# Patient Record
Sex: MALE | Race: WHITE | NOT HISPANIC OR LATINO | URBAN - METROPOLITAN AREA
[De-identification: names, ages, dates, MRNs, and addresses within clinical notes are randomized per-mention and may not be internally consistent; named-entity substitution may affect disease eponyms.]

---
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
## 2022-10-07 ENCOUNTER — OFFICE VISIT (OUTPATIENT)
Dept: NEUROLOGY | Facility: MEDICAL CENTER | Age: 22
End: 2022-10-07
Attending: PSYCHIATRY & NEUROLOGY
Payer: COMMERCIAL

## 2022-10-07 VITALS
WEIGHT: 119.05 LBS | HEART RATE: 102 BPM | DIASTOLIC BLOOD PRESSURE: 78 MMHG | OXYGEN SATURATION: 94 % | SYSTOLIC BLOOD PRESSURE: 128 MMHG | TEMPERATURE: 97.7 F

## 2022-10-07 DIAGNOSIS — G25.3 MYOCLONUS: ICD-10-CM

## 2022-10-07 PROCEDURE — 99204 OFFICE O/P NEW MOD 45 MIN: CPT | Performed by: PSYCHIATRY & NEUROLOGY

## 2022-10-07 PROCEDURE — 99202 OFFICE O/P NEW SF 15 MIN: CPT | Performed by: PSYCHIATRY & NEUROLOGY

## 2022-10-07 RX ORDER — ERGOCALCIFEROL 1.25 MG/1
1.25 CAPSULE ORAL
COMMUNITY
Start: 2022-09-24

## 2022-10-07 NOTE — PROGRESS NOTES
Chief Complaint   Patient presents with    New Patient     Movement disorder        History of present illness:  Renan Stevens 22 y.o. male with nocturnal jerks when he wakes up from sleep about once or twice weekly.   He is in bed when this occurs. This happens only if he is napping, in bed.   Over a 30 min period after waking, he may have recurrent jerks of his arms and legs. He is dropping things when he is at the breakfast table. There is 1 jerk per minute over this period that decrease in frequency over a 30 min period.     No personal history of seizures. No family history of epilepsy.   He has had this issue for the last 3 years. This is becoming more frequent in the morning.   He sleeps 6 hours/night but denies cataplexy.     No delay in motor or speech milestones.   He has completed high school and is in his 4th year of college. He is high performing academically.   He has high anxiety.     Past medical history:   Past Medical History:   Diagnosis Date    No known health problems        Past surgical history:   No past surgical history on file.    Family history:   Family History   Problem Relation Age of Onset    Asthma Sister        Social history:   Social History     Socioeconomic History    Marital status: Unknown     Spouse name: Not on file    Number of children: Not on file    Years of education: Not on file    Highest education level: Not on file   Occupational History    Not on file   Tobacco Use    Smoking status: Never    Smokeless tobacco: Never   Vaping Use    Vaping Use: Never used   Substance and Sexual Activity    Alcohol use: No    Drug use: No    Sexual activity: Never   Other Topics Concern    Behavioral problems Not Asked    Interpersonal relationships Not Asked    Sad or not enjoying activities Not Asked    Suicidal thoughts Not Asked    Poor school performance Not Asked    Reading difficulties Not Asked    Speech difficulties Not Asked    Writing difficulties Not Asked    Inadequate  sleep Not Asked    Excessive TV viewing Not Asked    Excessive video game use Not Asked    Inadequate exercise Not Asked    Sports related Not Asked    Poor diet Not Asked    Family concerns for drug/alcohol abuse Not Asked    Poor oral hygiene Not Asked    Bike safety Not Asked    Family concerns vehicle safety Not Asked   Social History Narrative    Not on file     Social Determinants of Health     Financial Resource Strain: Not on file   Food Insecurity: Not on file   Transportation Needs: Not on file   Physical Activity: Not on file   Stress: Not on file   Social Connections: Not on file   Intimate Partner Violence: Not on file   Housing Stability: Not on file       Current medications:   Current Outpatient Medications   Medication    vitamin D2, Ergocalciferol, (DRISDOL) 1.25 MG (35391 UT) Cap capsule     No current facility-administered medications for this visit.       Medication Allergy:  No Known Allergies    Physical examination:   Vitals:    10/07/22 1616   BP: 128/78   BP Location: Right arm   Patient Position: Sitting   BP Cuff Size: Adult   Pulse: (!) 102   Temp: 36.5 °C (97.7 °F)   TempSrc: Temporal   SpO2: 94%   Weight: 54 kg (119 lb 0.8 oz)     Neurological Exam  Mental Status  Awake and alert. Speech is normal. Language is fluent with no aphasia.    Cranial Nerves  CN III, IV, VI: Extraocular movements intact bilaterally. No nystagmus. Normal smooth pursuit.  CN VII:  Right: There is no facial weakness.  Left: There is no facial weakness.    Motor   No abnormal involuntary movements. Strength is 5/5 throughout all four extremities.    Sensory  Light touch is normal in upper and lower extremities.     Coordination  Right: Finger-to-nose normal. Rapid alternating movement normal.Left: Finger-to-nose normal. Rapid alternating movement normal.    Gait  Casual gait is normal including stance, stride, and arm swing.    Labs:  I reviewed the following labs personally:  None    Imaging:   None      ASSESSMENT AND PLAN:  Problem List Items Addressed This Visit    None  Visit Diagnoses       Myoclonus        Relevant Orders    Comp Metabolic Panel    TSH    Referral to Neurodiagnostics (EEG,EP,EMG/NCS/DBS)            1. Myoclonus  - Comp Metabolic Panel; Future  - TSH; Future  - Referral to Neurodiagnostics (EEG,EP,EMG/NCS/DBS)    Other orders  - vitamin D2, Ergocalciferol, (DRISDOL) 1.25 MG (93380 UT) Cap capsule; Take 1.25 Units by mouth one time as needed. Weekly    22-year-old male with myoclonic jerks that continued for about 30 minutes after he wakes up.  These are not solely limited to sleep or drowsy state.  There is no family history of epilepsy and he is normal developmentally.  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  He does not have evidence of a neurodegenerative disorder.  I have ordered metabolic panel, and have ordered an ambulatory EEG to hopefully capture the myoclonic jerks.  He will follow-up after the results of these tests.    FOLLOW-UP:   Return in about 3 months (around 1/7/2023) for virtual follow-up .    Total time spent for the day for this patient unrelated to procedure time is: 31 minutes. I spent 24 minutes in face to face time and I spent 2 minutes pre-charting and 5 minutes in post-visit documentation.      Dr. Rangel Gomez D.O.  [facility]

## 2022-10-07 NOTE — PATIENT INSTRUCTIONS
I have ordered blood tests - a metabolic panel and a thyroid test.     You will be called to schedule the EEG.

## 2022-12-27 ENCOUNTER — NON-PROVIDER VISIT (OUTPATIENT)
Dept: NEUROLOGY | Facility: MEDICAL CENTER | Age: 22
End: 2022-12-27
Attending: STUDENT IN AN ORGANIZED HEALTH CARE EDUCATION/TRAINING PROGRAM
Payer: COMMERCIAL

## 2022-12-27 DIAGNOSIS — G25.3 MYOCLONUS: ICD-10-CM

## 2022-12-27 PROCEDURE — 95708 EEG WO VID EA 12-26HR UNMNTR: CPT | Performed by: STUDENT IN AN ORGANIZED HEALTH CARE EDUCATION/TRAINING PROGRAM

## 2022-12-27 PROCEDURE — 99999 PR NO CHARGE: CPT | Performed by: STUDENT IN AN ORGANIZED HEALTH CARE EDUCATION/TRAINING PROGRAM

## 2022-12-27 PROCEDURE — 95700 EEG CONT REC W/VID EEG TECH: CPT | Performed by: PSYCHIATRY & NEUROLOGY

## 2022-12-27 PROCEDURE — 95700 EEG CONT REC W/VID EEG TECH: CPT | Performed by: STUDENT IN AN ORGANIZED HEALTH CARE EDUCATION/TRAINING PROGRAM

## 2022-12-27 PROCEDURE — 95719 EEG PHYS/QHP EA INCR W/O VID: CPT | Performed by: STUDENT IN AN ORGANIZED HEALTH CARE EDUCATION/TRAINING PROGRAM

## 2022-12-27 PROCEDURE — 95708 EEG WO VID EA 12-26HR UNMNTR: CPT | Performed by: PSYCHIATRY & NEUROLOGY

## 2022-12-27 NOTE — PROCEDURES
AMBULATORY ELECTROENCEPHALOGRAM REPORT      Referring provider:   Chang Gomez D.O.  [address]  [facility] [place] [address]      DOS: 12/27/2022       Duration of Recording: (23 hours and 8 minutes)      INDICATION:  Renan Stevens 22 y.o. male presenting with   myoclonic jerks    CURRENT OUTPATIENT MEDICATION LIST:   Current Outpatient Medications   Medication Instructions    vitamin D2 (Ergocalciferol) (DRISDOL) 1.25 Units, Oral, ONCE PRN, Weekly         TECHNIQUE: The EEG was set up and taken down by a Neurodiagnostic technologist who performed education to patient and staff.   A minimum but not limited to 23 electrodes and 23 channel recording was setup and performed by Neurodiagnostic technologist.   Impedances, electrode integrity, and technical impressions were documented a minimum of every 2-24 hour period by a Neurodiagnostic Technologist and reviewed by Interpreting Physician.    DESCRIPTION OF THE RECORD:  During maximal wakefulness, the background was continuous and showed a 9 Hz posterior dominant rhythm.  Reactivity and state changes were present.  During drowsiness, theta/delta frequencies were seen.    Sleep was captured and was characterized by diffuse background delta/theta activity with a loss of myogenic artifact.  N2 sleep transients in the form of sleep spindles and vertex waves were seen in the leads over the central regions.     ACTIVATION PROCEDURES:   Hyperventilation was performed by the patient for a total of 3 minutes. The technician performing the test noted good effort. This technique did not elicited any abnormalities on EEG.  and Intermittent Photic stimulation was performed in a stepwise fashion from 1 to 30 Hz and did not elicited any abnormalities on EEG.     ICTAL AND INTERICTAL FINDINGS:   Rare generalized spikes/polyspike and wave discharges in wakefulness and sleep, at times appearing fragmented.    No regional slowing was seen during this routine study.      No  seizures.    EKG: Sampling of the EKG recording did not demonstrate any abnormalities    EVENTS:  Push button events and/or ambulatory diary events: None    INTERPRETATION:  This is an abnormal ambulatory EEG recording in the awake and drowsy/sleep state(s):  -Rare generalized spikes/polyspike and wave discharges in wakefulness and sleep, at times appearing fragmented.  In the context of patient's history of myoclonus, the EEG findings may be consistent with a generalized epilepsy.   -No persistent focal asymmetries seen.  -No seizures. Clinical correlation is recommended.   -Clinical Events:  None          Paul Figueroa MD  Department of Neurology at [facility]  General Neurologist and Epileptologist  Director of [facility]'s Level III Comprehensive Epilepsy Program  Professor of Clinical Neurology, [facility].   Phone: [phone]  Fax: [phone]  E-mail: [email or web address]

## 2022-12-28 ENCOUNTER — NON-PROVIDER VISIT (OUTPATIENT)
Dept: NEUROLOGY | Facility: MEDICAL CENTER | Age: 22
End: 2022-12-28
Attending: STUDENT IN AN ORGANIZED HEALTH CARE EDUCATION/TRAINING PROGRAM
Payer: COMMERCIAL

## 2022-12-28 DIAGNOSIS — G25.3 MYOCLONUS: ICD-10-CM

## 2022-12-28 PROCEDURE — 95708 EEG WO VID EA 12-26HR UNMNTR: CPT | Performed by: STUDENT IN AN ORGANIZED HEALTH CARE EDUCATION/TRAINING PROGRAM

## 2022-12-28 PROCEDURE — 95719 EEG PHYS/QHP EA INCR W/O VID: CPT | Performed by: STUDENT IN AN ORGANIZED HEALTH CARE EDUCATION/TRAINING PROGRAM

## 2022-12-28 NOTE — PROCEDURES
"AMBULATORY ELECTROENCEPHALOGRAM REPORT      Referring provider:   Chang Gomez D.O.  [address] [place] [address]      DOS: 12/28/2022       Duration of Recording: (24 hours and 1 minutes)      INDICATION:  Renan Stevens 22 y.o. male presenting with   myoclonic jerks    CURRENT OUTPATIENT MEDICATION LIST:   Current Outpatient Medications   Medication Instructions    vitamin D2 (Ergocalciferol) (DRISDOL) 1.25 Units, Oral, ONCE PRN, Weekly         TECHNIQUE: The EEG was set up and taken down by a Neurodiagnostic technologist who performed education to patient and staff.   A minimum but not limited to 23 electrodes and 23 channel recording was setup and performed by Neurodiagnostic technologist.   Impedances, electrode integrity, and technical impressions were documented a minimum of every 2-24 hour period by a Neurodiagnostic Technologist and reviewed by Interpreting Physician.    DESCRIPTION OF THE RECORD:  During maximal wakefulness, the background was continuous and showed a 9 Hz posterior dominant rhythm.  Reactivity and state changes were present.  During drowsiness, theta/delta frequencies were seen.    Sleep was captured and was characterized by diffuse background delta/theta activity with a loss of myogenic artifact.  N2 sleep transients in the form of sleep spindles and vertex waves were seen in the leads over the central regions.     ACTIVATION PROCEDURES:   NA    ICTAL AND INTERICTAL FINDINGS:   Occasional and, at times, frequent generalized spikes/polyspike and wave discharges, at times in runs at 3.5-5 Hz for 0.5-2 seconds. Discharges were more frequently seen in the early morning upon waking and were less frequently present and more fragmented in sleep.     No regional slowing was seen during this routine study.          EKG: Sampling of the EKG recording did not demonstrate any abnormalities    EVENTS:  Push button events and/or ambulatory diary events: One event of \"muscle jerking\" " "at 8:12 AM was associated with generalized 3-4 Hz polyspike and wave discharges and EMG artifact consistent with epileptic myoclonus.     INTERPRETATION:  This is an abnormal ambulatory EEG recording in the awake and drowsy/sleep state(s):  -Occasional and, at times, frequent generalized spikes/polyspike and wave discharges, at times in runs at 3.5-5 Hz for 0.5-2 seconds. Discharges were more frequently seen in the early morning upon waking and were less frequently present and more fragmented in sleep. One event of \"muscle jerking\" at 8:12 AM was associated with generalized 3-4 Hz polyspike and wave discharges and EMG artifact consistent with epileptic myoclonus. 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              Paul Figueroa MD  Department of Neurology at [facility]  General Neurologist and Epileptologist  Director of [facility]'s Level III Comprehensive Epilepsy Program  Professor of Clinical Neurology, [facility].   Phone: [phone]  Fax: [phone]  E-mail: [email or web address]     "

## 2022-12-29 ENCOUNTER — NON-PROVIDER VISIT (OUTPATIENT)
Dept: NEUROLOGY | Facility: MEDICAL CENTER | Age: 22
End: 2022-12-29
Attending: PSYCHIATRY & NEUROLOGY
Payer: COMMERCIAL

## 2022-12-29 DIAGNOSIS — G25.3 MYOCLONUS: ICD-10-CM

## 2022-12-29 PROCEDURE — 95719 EEG PHYS/QHP EA INCR W/O VID: CPT | Performed by: STUDENT IN AN ORGANIZED HEALTH CARE EDUCATION/TRAINING PROGRAM

## 2022-12-29 PROCEDURE — 95708 EEG WO VID EA 12-26HR UNMNTR: CPT | Performed by: STUDENT IN AN ORGANIZED HEALTH CARE EDUCATION/TRAINING PROGRAM

## 2022-12-29 NOTE — PROCEDURES
AMBULATORY ELECTROENCEPHALOGRAM REPORT      Referring provider:   Chang Gomez D.O.  [address]  [facility] [place] [address]      DOS: 12/29/2022       Duration of Recording: (23 hours and 10 minutes)      INDICATION:  Renan Stevens 22 y.o. male presenting with   myoclonic jerks    CURRENT OUTPATIENT MEDICATION LIST:   Current Outpatient Medications   Medication Instructions    vitamin D2 (Ergocalciferol) (DRISDOL) 1.25 Units, Oral, ONCE PRN, Weekly         TECHNIQUE: The EEG was set up and taken down by a Neurodiagnostic technologist who performed education to patient and staff.   A minimum but not limited to 23 electrodes and 23 channel recording was setup and performed by Neurodiagnostic technologist.   Impedances, electrode integrity, and technical impressions were documented a minimum of every 2-24 hour period by a Neurodiagnostic Technologist and reviewed by Interpreting Physician.    DESCRIPTION OF THE RECORD:  During maximal wakefulness, the background was continuous and showed a 9 Hz posterior dominant rhythm.  Reactivity and state changes were present.  During drowsiness, theta/delta frequencies were seen.    Sleep was captured and was characterized by diffuse background delta/theta activity with a loss of myogenic artifact.  N2 sleep transients in the form of sleep spindles and vertex waves were seen in the leads over the central regions.     ACTIVATION PROCEDURES:   NA    ICTAL AND INTERICTAL FINDINGS:   Occasional and, at times, frequent generalized spikes/polyspike and wave discharges, at times in runs at 3.5-5 Hz for 0.5-2 seconds. There was one run lasting 7 seconds that was not reported by the patient.  Discharges were more frequently seen in the early morning upon waking and were less frequently present and more fragmented in sleep.     No regional slowing was seen during this routine study.          EKG: Sampling of the EKG recording did not demonstrate any abnormalities    EVENTS:  Two  "events of \"muscle jerking\" were associated with generalized 3-4 Hz polyspike and wave discharges and EMG artifact consistent with epileptic myoclonus.     INTERPRETATION:  This is an abnormal ambulatory EEG recording in the awake and drowsy/sleep state(s):  -Occasional and, at times, frequent generalized spikes/polyspike and wave discharges, at times in runs at 3.5-5 Hz for 0.5-2 seconds. There was one run lasting 7 seconds that was not reported by the patient. Discharges were more frequently seen in the early morning and upon awaking; they were less frequently present and more fragmented in sleep. Two events of \"muscle jerking\" were associated with generalized 3-4 Hz polyspike and wave discharges and EMG artifact consistent with epileptic myoclonus. 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              Paul Figueroa MD  Department of Neurology at [facility]  General Neurologist and Epileptologist  Director of [facility]'s Level III Comprehensive Epilepsy Program  Professor of Clinical Neurology, [facility].   Phone: [phone]  Fax: [phone]  E-mail: [email or web address]     "

## 2022-12-30 ENCOUNTER — NON-PROVIDER VISIT (OUTPATIENT)
Dept: NEUROLOGY | Facility: MEDICAL CENTER | Age: 22
End: 2022-12-30
Attending: PSYCHIATRY & NEUROLOGY
Payer: COMMERCIAL

## 2022-12-30 DIAGNOSIS — G40.309 GENERALIZED EPILEPSY (HCC): ICD-10-CM

## 2022-12-30 PROCEDURE — 99999 PR NO CHARGE: CPT | Performed by: STUDENT IN AN ORGANIZED HEALTH CARE EDUCATION/TRAINING PROGRAM

## 2022-12-30 RX ORDER — LEVETIRACETAM 750 MG/1
750 TABLET ORAL 2 TIMES DAILY
Qty: 180 TABLET | Refills: 3 | Status: SHIPPED | OUTPATIENT
Start: 2022-12-30 | End: 2024-01-12

## 2023-01-13 ENCOUNTER — TELEPHONE (OUTPATIENT)
Dept: NEUROLOGY | Facility: MEDICAL CENTER | Age: 23
End: 2023-01-13
Payer: COMMERCIAL

## 2023-01-20 ENCOUNTER — OFFICE VISIT (OUTPATIENT)
Dept: NEUROLOGY | Facility: MEDICAL CENTER | Age: 23
End: 2023-01-20
Attending: PSYCHIATRY & NEUROLOGY
Payer: COMMERCIAL

## 2023-01-20 VITALS
SYSTOLIC BLOOD PRESSURE: 134 MMHG | DIASTOLIC BLOOD PRESSURE: 60 MMHG | WEIGHT: 147.71 LBS | BODY MASS INDEX: 20.68 KG/M2 | HEIGHT: 71 IN | TEMPERATURE: 96.7 F | OXYGEN SATURATION: 99 % | HEART RATE: 105 BPM

## 2023-01-20 DIAGNOSIS — G40.B09 NONINTRACTABLE JUVENILE MYOCLONIC EPILEPSY WITHOUT STATUS EPILEPTICUS (HCC): ICD-10-CM

## 2023-01-20 PROBLEM — G25.3 MYOCLONUS: Status: RESOLVED | Noted: 2022-10-07 | Resolved: 2023-01-20

## 2023-01-20 PROCEDURE — 99213 OFFICE O/P EST LOW 20 MIN: CPT | Performed by: PSYCHIATRY & NEUROLOGY

## 2023-01-20 PROCEDURE — 99211 OFF/OP EST MAY X REQ PHY/QHP: CPT | Performed by: PSYCHIATRY & NEUROLOGY

## 2023-01-20 ASSESSMENT — PATIENT HEALTH QUESTIONNAIRE - PHQ9: CLINICAL INTERPRETATION OF PHQ2 SCORE: 0

## 2023-01-20 NOTE — PROGRESS NOTES
Chief Complaint   Patient presents with    Follow-Up     Seizure Disorder       History of present illness:  Renan Stevens 22 y.o. male with nocturnal jerks when he wakes up from sleep about once or twice weekly.   He is in bed when this occurs. This happens only if he is napping, in bed.   Over a 30 min period after waking, he may have recurrent jerks of his arms and legs. He is dropping things when he is at the breakfast table. There is 1 jerk per minute over this period that decrease in frequency over a 30 min period.      No personal history of seizures. No family history of epilepsy.   He has had this issue for the last 3 years. This is becoming more frequent in the morning.   He sleeps 6 hours/night but denies cataplexy.      No delay in motor or speech milestones.   He has completed high school and is in his 4th year of [employer]. He is high performing academically.   He has high anxiety.      1/20/23: His EEG demonstrated findings consistent with MALA. He is on keppra 750mg twice daily and this has resolved his myoclonic seizures. There have been times when people have been saying his name and have not been able to get his attention when he is sitting. This is not a frequent occurrence and the last episode was about 6 months ago.   The same day that he was started on keppra his myoclonus resolved. He is tolerating the drug well, with some mild fatigue.     Past medical history:   Past Medical History:   Diagnosis Date    No known health problems        Past surgical history:   No past surgical history on file.    Family history:   Family History   Problem Relation Age of Onset    Asthma Sister        Social history:   Social History     Socioeconomic History    Marital status: Unknown     Spouse name: Not on file    Number of children: Not on file    Years of education: Not on file    Highest education level: Not on file   Occupational History    Not on file   Tobacco Use    Smoking status: Never    Smokeless  "tobacco: Never   Vaping Use    Vaping Use: Never used   Substance and Sexual Activity    Alcohol use: No    Drug use: No    Sexual activity: Never   Other Topics Concern    Behavioral problems Not Asked    Interpersonal relationships Not Asked    Sad or not enjoying activities Not Asked    Suicidal thoughts Not Asked    Poor school performance Not Asked    Reading difficulties Not Asked    Speech difficulties Not Asked    Writing difficulties Not Asked    Inadequate sleep Not Asked    Excessive TV viewing Not Asked    Excessive video game use Not Asked    Inadequate exercise Not Asked    Sports related Not Asked    Poor diet Not Asked    Family concerns for drug/alcohol abuse Not Asked    Poor oral hygiene Not Asked    Bike safety Not Asked    Family concerns vehicle safety Not Asked   Social History Narrative    Not on file     Social Determinants of Health     Financial Resource Strain: Not on file   Food Insecurity: Not on file   Transportation Needs: Not on file   Physical Activity: Not on file   Stress: Not on file   Social Connections: Not on file   Intimate Partner Violence: Not on file   Housing Stability: Not on file       Current medications:   Current Outpatient Medications   Medication    levetiracetam (KEPPRA) 750 MG tablet    vitamin D2, Ergocalciferol, (DRISDOL) 1.25 MG (67536 UT) Cap capsule     No current facility-administered medications for this visit.       Medication Allergy:  No Known Allergies    Physical examination:   Vitals:    01/20/23 0708   BP: 134/60   BP Location: Right arm   Patient Position: Sitting   BP Cuff Size: Adult   Pulse: (!) 105   Temp: 35.9 °C (96.7 °F)   TempSrc: Temporal   SpO2: 99%   Weight: 67 kg (147 lb 11.3 oz)   Height: 1.803 m (5' 11\")     Labs:  I reviewed the following labs personally:  None     Imaging:   EEG  INTERPRETATION:  This is an abnormal ambulatory EEG recording in the awake and drowsy/sleep state(s):  -Occasional and, at times, frequent generalized " "spikes/polyspike and wave discharges, at times in runs at 3.5-5 Hz for 0.5-2 seconds. There was one run lasting 7 seconds that was not reported by the patient. Discharges were more frequently seen in the early morning and upon awaking; they were less frequently present and more fragmented in sleep. Two events of \"muscle jerking\" were associated with generalized 3-4 Hz polyspike and wave discharges and EMG artifact consistent with epileptic myoclonus. 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  EEG demonstrated generalized polyspike and wave discharges at 3.5 to 5 Hz.  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  Patient's myoclonic jerks are resolved after starting Keppra.  I have counseled him on the diagnosis of juvenile myoclonic epilepsy.  We will not proceed with structural MRI imaging given that this is usually normal in this condition.  No further work-up is required.  We will follow-up in 6 months.    FOLLOW-UP:   Return in about 6 months (around 7/20/2023).    Total time spent for the day for this patient unrelated to procedure time is: 25 minutes. I spent 8 minutes in face to face time and I spent 12 minutes pre-charting and 5 minutes in post-visit documentation.      Dr. Rangel Gomez D.O.  [facility]    "

## 2023-01-20 NOTE — PATIENT INSTRUCTIONS
Juvenile Myoclonic Epilepsy     MALA is a fairly common epilepsy syndrome that usually begins between the ages of 12 to 18 years. Seizure symptoms include myoclonic jerking of the shoulders, arms and sometimes legs. Absence seizures may also be present. Seizures usually occur early in the morning or within a couple of hours of awakening. In many cases, and after a number of years, the young person will start to experience generalised tonic-clonic seizures.  MALA has a strong genetic component, with up to half of all affected children having a family history of seizures or epilepsy.  Seizures can be triggered by early awakening, lack of sleep, alcohol, drugs, fasting, menstruation or flickering lights (photosensitive epilepsy). Because of this it is particularly important for adolescents living with MALA to adopt regular lifestyle habits and choices and follow their treatment plan carefully.  People living with MALA will usually require lifelong treatment with AEDs, and generally have a good prognosis.    https://www.epilepsy.com/what-[email or web address]-epilepsy/syndromes/juvenile-myoclonic-epilepsy#:~:text=Myoclonic%20jerks%20or%20seizures%20in,or%20prone%20to%20dropping%20things.

## 2023-07-25 ENCOUNTER — OFFICE VISIT (OUTPATIENT)
Dept: NEUROLOGY | Facility: MEDICAL CENTER | Age: 23
End: 2023-07-25
Attending: PSYCHIATRY & NEUROLOGY
Payer: COMMERCIAL

## 2023-07-25 VITALS
BODY MASS INDEX: 19.69 KG/M2 | HEART RATE: 87 BPM | OXYGEN SATURATION: 98 % | DIASTOLIC BLOOD PRESSURE: 72 MMHG | WEIGHT: 140.65 LBS | TEMPERATURE: 98 F | HEIGHT: 71 IN | RESPIRATION RATE: 14 BRPM | SYSTOLIC BLOOD PRESSURE: 128 MMHG

## 2023-07-25 DIAGNOSIS — G40.B09 NONINTRACTABLE JUVENILE MYOCLONIC EPILEPSY WITHOUT STATUS EPILEPTICUS (HCC): ICD-10-CM

## 2023-07-25 PROCEDURE — 3074F SYST BP LT 130 MM HG: CPT | Performed by: PSYCHIATRY & NEUROLOGY

## 2023-07-25 PROCEDURE — 3078F DIAST BP <80 MM HG: CPT | Performed by: PSYCHIATRY & NEUROLOGY

## 2023-07-25 PROCEDURE — 99213 OFFICE O/P EST LOW 20 MIN: CPT | Performed by: PSYCHIATRY & NEUROLOGY

## 2023-07-25 PROCEDURE — 99211 OFF/OP EST MAY X REQ PHY/QHP: CPT | Performed by: PSYCHIATRY & NEUROLOGY

## 2023-07-25 NOTE — PROGRESS NOTES
Chief Complaint   Patient presents with    Follow-Up    Seizure     DMV paperwork       History of present illness:  Renan Stevens 23 y.o. male with jerking movements that woke him up from sleep and would persist after waking in the early morning.  EEG demonstrated generalized polyspike and wave discharges at 3.5 to 5 Hz.  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  Patient's myoclonic jerks are resolved after starting Keppra.    There have been no myoclonic jerks since starting keppra on 12/30/22 and the myoclonic jerks have been resolved since drug initiation. He has never had loss of consciousness spells.     He needs his DMV paperwork signed.      Past medical history:   Past Medical History:   Diagnosis Date    No known health problems        Past surgical history:   No past surgical history on file.    Family history:   Family History   Problem Relation Age of Onset    Asthma Sister        Social history:   Social History     Socioeconomic History    Marital status: Unknown     Spouse name: Not on file    Number of children: Not on file    Years of education: Not on file    Highest education level: Not on file   Occupational History    Not on file   Tobacco Use    Smoking status: Never    Smokeless tobacco: Never   Vaping Use    Vaping Use: Never used   Substance and Sexual Activity    Alcohol use: No    Drug use: No    Sexual activity: Never   Other Topics Concern    Behavioral problems Not Asked    Interpersonal relationships Not Asked    Sad or not enjoying activities Not Asked    Suicidal thoughts Not Asked    Poor school performance Not Asked    Reading difficulties Not Asked    Speech difficulties Not Asked    Writing difficulties Not Asked    Inadequate sleep Not Asked    Excessive TV viewing Not Asked    Excessive video game use Not Asked    Inadequate exercise Not Asked    Sports related Not Asked    Poor diet Not Asked    Family concerns for drug/alcohol abuse Not Asked    Poor oral  "hygiene Not Asked    Bike safety Not Asked    Family concerns vehicle safety Not Asked   Social History Narrative    Not on file     Social Determinants of Health     Financial Resource Strain: Not on file   Food Insecurity: Not on file   Transportation Needs: Not on file   Physical Activity: Not on file   Stress: Not on file   Social Connections: Not on file   Intimate Partner Violence: Not on file   Housing Stability: Not on file       Current medications:   Current Outpatient Medications   Medication    levetiracetam (KEPPRA) 750 MG tablet    vitamin D2, Ergocalciferol, (DRISDOL) 1.25 MG (63895 UT) Cap capsule     No current facility-administered medications for this visit.       Medication Allergy:  No Known Allergies    Physical examination:   Vitals:    07/25/23 0959   BP: 128/72   BP Location: Left arm   Patient Position: Sitting   BP Cuff Size: Adult   Pulse: 87   Resp: 14   Temp: 36.7 °C (98 °F)   TempSrc: Temporal   SpO2: 98%   Weight: 63.8 kg (140 lb 10.5 oz)   Height: 1.803 m (5' 11\")       Labs:  I reviewed the following labs personally:  None     Imaging:   EEG  INTERPRETATION:  This is an abnormal ambulatory EEG recording in the awake and drowsy/sleep state(s):  -Occasional and, at times, frequent generalized spikes/polyspike and wave discharges, at times in runs at 3.5-5 Hz for 0.5-2 seconds. There was one run lasting 7 seconds that was not reported by the patient. Discharges were more frequently seen in the early morning and upon awaking; they were less frequently present and more fragmented in sleep. Two events of \"muscle jerking\" were associated with generalized 3-4 Hz polyspike and wave discharges and EMG artifact consistent with epileptic myoclonus. 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 Today, I completed his DMV paperwork. He is able to resume driving and there are no LOC spells.     FOLLOW-UP:   Return in about 1 year (around 7/25/2024).    Total time spent for the day for this patient unrelated to procedure time is: 20 minutes. I spent 10 minutes in face to face time and I spent 5 minutes pre-charting and 5 minutes in post-visit documentation.      Dr. Rangel Gomez D.O.  [facility]

## 2024-01-12 DIAGNOSIS — G40.309 GENERALIZED EPILEPSY (HCC): ICD-10-CM

## 2024-01-12 RX ORDER — LEVETIRACETAM 750 MG/1
750 TABLET ORAL 2 TIMES DAILY
Qty: 180 TABLET | Refills: 3 | Status: SHIPPED | OUTPATIENT
Start: 2024-01-12

## 2024-01-12 NOTE — TELEPHONE ENCOUNTER
Received request via: Pharmacy    Medication Name/Dosage Keppra 750 MG     When was medication last prescribed 12.30.22    How many refills were previously provided 3    How many Refills does he patient have left from last prescription 0    Was the patient seen in the last year in this department? Yes   Date of last office visit 7.25.2023     Per last Neurology Office Visit, when was the date of next follow up visit set for?                            Date of office visit follow up request 7.25.2024     Does the patient have an upcoming appointment? No   If yes, when Will call patient to follow-up             If no, schedule appointment Called and [facility]    Does the patient have [facility] and need 100 day supply (blood pressure, diabetes and cholesterol meds only)? Medication is not for cholesterol, blood pressure or diabetes

## 2024-08-16 ENCOUNTER — OFFICE VISIT (OUTPATIENT)
Dept: NEUROLOGY | Facility: MEDICAL CENTER | Age: 24
End: 2024-08-16
Attending: PSYCHIATRY & NEUROLOGY
Payer: COMMERCIAL

## 2024-08-16 VITALS
OXYGEN SATURATION: 99 % | TEMPERATURE: 96.6 F | HEIGHT: 71 IN | HEART RATE: 82 BPM | SYSTOLIC BLOOD PRESSURE: 118 MMHG | WEIGHT: 144.18 LBS | DIASTOLIC BLOOD PRESSURE: 62 MMHG | BODY MASS INDEX: 20.19 KG/M2

## 2024-08-16 DIAGNOSIS — G40.B09 NONINTRACTABLE JUVENILE MYOCLONIC EPILEPSY WITHOUT STATUS EPILEPTICUS (HCC): ICD-10-CM

## 2024-08-16 PROCEDURE — 99212 OFFICE O/P EST SF 10 MIN: CPT | Performed by: PSYCHIATRY & NEUROLOGY

## 2024-08-16 PROCEDURE — 99211 OFF/OP EST MAY X REQ PHY/QHP: CPT | Performed by: PSYCHIATRY & NEUROLOGY

## 2024-08-16 PROCEDURE — 3074F SYST BP LT 130 MM HG: CPT | Performed by: PSYCHIATRY & NEUROLOGY

## 2024-08-16 PROCEDURE — 3078F DIAST BP <80 MM HG: CPT | Performed by: PSYCHIATRY & NEUROLOGY

## 2024-08-16 ASSESSMENT — PATIENT HEALTH QUESTIONNAIRE - PHQ9: CLINICAL INTERPRETATION OF PHQ2 SCORE: 0

## 2024-08-16 NOTE — PROGRESS NOTES
Chief Complaint   Patient presents with    Follow-Up     Dmv paperwork          History of present illness:  Renan Stevens 24 y.o. male with  jerking movements that woke him up from sleep and would persist after waking in the early morning.  EEG demonstrated generalized polyspike and wave discharges at 3.5 to 5 Hz.  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  Patient's myoclonic jerks are resolved after starting Keppra.     There have been no myoclonic jerks since starting keppra on 12/30/22 and the myoclonic jerks have been resolved since drug initiation. He has never had loss of consciousness spells.     He is teaching dance and gymnastics.     Past medical history:   Past Medical History:   Diagnosis Date    No known health problems        Past surgical history:   No past surgical history on file.    Family history:   Family History   Problem Relation Age of Onset    Asthma Sister        Social history:   Social History     Socioeconomic History    Marital status: Unknown     Spouse name: Not on file    Number of children: Not on file    Years of education: Not on file    Highest education level: Not on file   Occupational History    Not on file   Tobacco Use    Smoking status: Never    Smokeless tobacco: Never   Vaping Use    Vaping status: Never Used   Substance and Sexual Activity    Alcohol use: Yes     Comment: rarely    Drug use: No    Sexual activity: Never   Other Topics Concern    Behavioral problems Not Asked    Interpersonal relationships Not Asked    Sad or not enjoying activities Not Asked    Suicidal thoughts Not Asked    Poor school performance Not Asked    Reading difficulties Not Asked    Speech difficulties Not Asked    Writing difficulties Not Asked    Inadequate sleep Not Asked    Excessive TV viewing Not Asked    Excessive video game use Not Asked    Inadequate exercise Not Asked    Sports related Not Asked    Poor diet Not Asked    Family concerns for drug/alcohol abuse  "Not Asked    Poor oral hygiene Not Asked    Bike safety Not Asked    Family concerns vehicle safety Not Asked   Social History Narrative    Not on file     Social Determinants of Health     Financial Resource Strain: Not on file   Food Insecurity: Not on file   Transportation Needs: Not on file   Physical Activity: Not on file   Stress: Not on file   Social Connections: Not on file   Intimate Partner Violence: Not on file   Housing Stability: Not on file       Current medications:   Current Outpatient Medications   Medication    levetiracetam (KEPPRA) 750 MG tablet    vitamin D2, Ergocalciferol, (DRISDOL) 1.25 MG (41497 UT) Cap capsule     No current facility-administered medications for this visit.       Medication Allergy:  No Known Allergies    Physical examination:   Vitals:    08/16/24 0745   BP: 118/62   BP Location: Right arm   Patient Position: Sitting   BP Cuff Size: Adult   Pulse: 82   Temp: 35.9 °C (96.6 °F)   TempSrc: Temporal   SpO2: 99%   Weight: 65.4 kg (144 lb 2.9 oz)   Height: 1.803 m (5' 11\")       Imaging:   EEG  INTERPRETATION:  This is an abnormal ambulatory EEG recording in the awake and drowsy/sleep state(s):  -Occasional and, at times, frequent generalized spikes/polyspike and wave discharges, at times in runs at 3.5-5 Hz for 0.5-2 seconds. There was one run lasting 7 seconds that was not reported by the patient. Discharges were more frequently seen in the early morning and upon awaking; they were less frequently present and more fragmented in sleep. Two events of \"muscle jerking\" were associated with generalized 3-4 Hz polyspike and wave discharges and EMG artifact consistent with epileptic myoclonus. 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   He is on keppra 750mg twice daily and we considered lowering the dose to 500mg twice daily however refrained given that there are no significant side effects. He feels tired but it is mild and he is not sleeping as well during the summer due to heat.   Today, I completed his DMV paperwork that is required annually.     FOLLOW-UP:   Return in about 1 year (around 8/16/2025).    Total time spent for the day for this patient unrelated to procedure time is: 15 minutes. I spent 10 minutes in face to face time and I spent 2 minutes pre-charting and 3 minutes in post-visit documentation.      Dr. Rangel Gomez D.O.  [facility]  Movement Disorders Specialist

## 2025-01-17 DIAGNOSIS — G40.309 GENERALIZED EPILEPSY (HCC): ICD-10-CM

## 2025-01-17 NOTE — TELEPHONE ENCOUNTER
Received request via: Pharmacy    Medication Name/Dosage Keppra 750mg     When was medication last prescribed 1/12/24    How many refills were previously provided 3    How many Refills does he patient have left from last prescription 0    Was the patient seen in the last year in this department? Yes   Date of last office visit 8/16/24     Per last Neurology Office Visit, when was the date of next follow up visit set for?                          1 yr   Date of office visit follow up request 8/16/25     Does the patient have an upcoming appointment? No   If yes, when              If no, schedule appointment will be going to a new provider     Does the patient have [facility] and need 100 day supply (blood pressure, diabetes and cholesterol meds only)? Medication is not for blood pressure,diabetes, or cholesterol

## 2025-01-29 RX ORDER — LEVETIRACETAM 750 MG/1
750 TABLET ORAL 2 TIMES DAILY
Qty: 180 TABLET | Refills: 3 | Status: SHIPPED | OUTPATIENT
Start: 2025-01-29

## 2025-03-12 ENCOUNTER — OCCUPATIONAL MEDICINE (OUTPATIENT)
Dept: URGENT CARE | Facility: CLINIC | Age: 25
End: 2025-03-12
Payer: COMMERCIAL

## 2025-03-12 VITALS
BODY MASS INDEX: 20.16 KG/M2 | WEIGHT: 144 LBS | HEIGHT: 71 IN | HEART RATE: 94 BPM | TEMPERATURE: 97.2 F | SYSTOLIC BLOOD PRESSURE: 124 MMHG | RESPIRATION RATE: 14 BRPM | DIASTOLIC BLOOD PRESSURE: 80 MMHG | OXYGEN SATURATION: 98 %

## 2025-03-12 DIAGNOSIS — S05.01XA ABRASION OF RIGHT CORNEA, INITIAL ENCOUNTER: ICD-10-CM

## 2025-03-12 PROCEDURE — 99213 OFFICE O/P EST LOW 20 MIN: CPT | Performed by: FAMILY MEDICINE

## 2025-03-12 RX ORDER — MOXIFLOXACIN 5 MG/ML
1 SOLUTION/ DROPS OPHTHALMIC 3 TIMES DAILY
Qty: 3 ML | Refills: 0 | Status: SHIPPED | OUTPATIENT
Start: 2025-03-12

## 2025-03-12 RX ORDER — TOBRAMYCIN 3 MG/ML
1 SOLUTION/ DROPS OPHTHALMIC EVERY 4 HOURS
Status: CANCELLED | OUTPATIENT
Start: 2025-03-12

## 2025-03-12 NOTE — LETTER
PHYSICIAN’S AND CHIROPRACTIC PHYSICIAN'S   PROGRESS REPORT   CERTIFICATION OF DISABILITY Claim Number:     Social Security Number:    Patient’s Name: Renan Stevens Date of Injury: 3/8/2025   Employer: [employer] Name of MCO (if applicable):      Patient’s Job Description/Occupation: [employer]       Previous Injuries/Diseases/Surgeries Contributing to the Condition:         Diagnosis: (S05.01XA) Abrasion of right cornea, initial encounter      Related to the Industrial Injury? Yes     Explain: DOI: 3/8/2025  TENZIN: He was spotting a child at work when she accidentally his right eye with her toe.  He subsequently developed irritation, discharge, and pinkish discoloration to his eye. He does wear both corrective eye glasses and contact lenses. Denies prior eye injury. He also teachers dance as secondary employment.      Objective Medical Findings: EOM intact bilaterally. PERRLA. Eye was numbed with proparacaine and then stained with fluorescein.  It was then evaluated under Woods lamp which showed large corneal abrasion to the lateral aspect of his right eye.         None - Discharged                         Stable  No                 Ratable  No        Generally Improved                         Condition Worsened               X   Condition Same  May Have Suffered a Permanent Disability No     Treatment Plan:    -May continue full duty.  -Prescription for Vigamox has been sent.         No Change in Therapy                  PT/OT Prescribed                      Medication May be Used While Working        Case Management                          PT/OT Discontinued    Consultation    Further Diagnostic Studies:    Prescription(s)               X  Released to FULL DUTY /No Restrictions on (Date):  3/12/2025    Certified TOTALLY TEMPORARILY DISABLED (Indicate Dates) From:   To:      Released to RESTRICTED/Modified Duty on (Date): From:   To:    Restrictions Are:  Temporary      No Sitting    No  Standing    No Pulling Other:         No Bending at Waist     No Stooping     No Lifting        No Carrying     No Walking Lifting Restricted to (lbs.):          No Pushing        No Climbing     No Reaching Above Shoulders       Date of Next Visit:  3/19/2025 Date of this Exam: 3/12/2025 Physician/Chiropractic Physician Name: Mackenzie Norton M.D. Physician/Chiropractic Physician Signature:  Jan Mccallum DO MPH     [place]:  [address] [place] [address], [address] [place], [place] [address] - Telephone [phone] [place]:  [address] [place] [address], [address] [place], [place] [address] - Telephone [phone]    [email or web address]  D-39 (Rev. 10/24)

## 2025-03-12 NOTE — LETTER
"  EMPLOYEE’S CLAIM FOR COMPENSATION/ REPORT OF INITIAL TREATMENT  FORM C-4  PLEASE TYPE OR PRINT    EMPLOYEE’S CLAIM - PROVIDE ALL INFORMATION REQUESTED   First Name                    TOSHA Urrutia                  Last Name  Rodney Birthdate                    2000                Sex  [x]Male Claim Number (Insurer’s Use Only)     Mailing Address  [address] Age  25 y.o. Height  1.803 m (5' 11\") Weight  65.3 kg (144 lb) Social Security Number     [facility] Zip  [address] Telephone  [phone] (home)    Email  [email or web address]    Primary Language Spoken  English    INSURER   THIRD-[employer]Y   [employer]   Employee's Occupation (Job Title) When Injury or Occupational Disease Occurred  [employer]    Employer's Name/Company Name  [employer]  Telephone  [phone]    Office Mail Address (Number and Street)  [address]     Date of Injury (if applicable) 3/8/2025               Hours Injury (if applicable)  11:00 AM am    pm Date Employer Notified  3/12/2025 Last Day of Work after Injury or Occupational Disease  3/12/2025 Supervisor to Whom Injury Reported  Danielle Castillo   Address or Location of Accident (if applicable)  Work [1]   What were you doing at the time of accident? (if applicable)  Spotting a 3 year old    How did this injury or occupational disease occur? (Be specific and answer in detail. Use additional sheet if necessary)  Student unexpededly flaied feet and made contact with my right ete with her toe. 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  N/A Witnesses to the Accident (if applicable)  Dayanna's Mom      Nature of Injury " or Occupational Disease  Laceration  Part(s) of Body Injured or Affected  Eye (R) N/A N/A    I CERTIFY THAT THE ABOVE IS TRUE AND CORRECT TO T HE BEST OF MY KNOWLEDGE AND THAT I HAVE PROVIDED THIS INFORMATION IN ORDER TO OBTAIN THE BENEFITS OF [place]CC[place], OR CHAPTER 617 OF [place]RS).  I HEREBY AUTHORIZE ANY PHYSICIAN, CHIROPRACTOR, SURGEON, PRACTITIONER OR ANY OTHER PERSON, ANY HOSPITAL, INCLUDING [facility] OR [facility], ANY  MEDICAL SERVICE ORGANIZATION, ANY INSURANCE COMPANY, OR OTHER INSTITUTION OR ORGANIZATION TO RELEASE TO EACH OTHER, ANY MEDICAL OR OTHER INFORMATION, INCLUDING BENEFITS PAID OR PAYABLE, PERTINENT TO THIS INJURY OR DISEASE, EXCEPT INFORMATION RELATIVE TO DIAGNOSIS, TREATMENT AND/OR COUNSELING FOR AIDS, PSYCHOLOGICAL CONDITIONS, ALCOHOL OR CONTROLLED SUBSTANCES, FOR WHICH I MUST GIVE SPECIFIC AUTHORIZATION.  A PHOTOSTAT OF THIS AUTHORIZATION SHALL BE VALID AS THE ORIGINAL.     Date   Place Employee’s Original or  *Electronic Signature   THIS REPORT MUST BE COMPLETED AND MAILED WITHIN 3 WORKING DAYS OF TREATMENT   Place  [facility]    Name of Facility  [facility]   Date 3/12/2025 Diagnosis and Description of Injury or Occupational Disease  (S05.01XA) Abrasion of right cornea, initial encounter  The encounter diagnosis was Abrasion of right cornea, initial encounter. Is there evidence that the injured employee was under the influence of alcohol and/or another controlled substance at the time of accident?  []No  [] Yes (if yes, please explain)   Hour 3:10 PM  No   Treatment: -May continue full duty.  -Prescription for Vigamox has been sent.    Have you advised the patient to remain off work five days or more?   No  [] Yes  If yes, indicate dates: From_ _                                                      To __ _  [] No   If no, is the injured employee capable of: [] full duty Yes   []  modified duty      If modified duty, specify any limitations / restrictions:__________________  ___ ___________________________     X-Ray Findings:      From information given by the employee, together with medical evidence, can you directly connect this injury or occupational disease as job incurred?  []Yes   [] No Yes    Is additional medical care by a physician indicated? []Yes [] No  Yes    Do you know of any previous injury or disease contributing to this condition or occupational disease? []Yes [] No (Explain if yes)                          No   Date  3/12/2025 Print Health Care Provider’s Name  Keri Norton M.D. I certify that the employer’s copy of  this form was delivered to the employer on:   Address  [address] INSURER'S USE ONLY                       [place] Zip  [address] Provider’s Tax ID Number  [id number]   Telephone  Dept: [phone]    Health Care Provider’s Original or Electronic Signature      e-KERI Sams M.D.    Degree (MD,DO, DC,PA-C,APRN)  MD  Choose (if applicable)      ORIGINAL - TREATING HEALTHCARE PROVIDER PAGE 2 - INSURER/TPA PAGE 3 - EMPLOYER PAGE 4 - EMPLOYEE             Form C-4 (rev.02/25)

## 2025-03-12 NOTE — PROGRESS NOTES
"  Subjective:     Renan Stevens is a 25 y.o. male who presents for Other (Pt states he was spotting a kid and she flailed her legs hitting him in his eye, red and swollen (R) side )    DOI: 3/8/2025  TENZIN: He was spotting a child at work when she accidentally his right eye with her toe.  He subsequently developed irritation, discharge, and pinkish discoloration to his eye. He does wear both corrective eye glasses and contact lenses. Denies prior eye injury. He also teachers dance as secondary employment.     Objective:     /80 (BP Location: Left arm, Patient Position: Sitting, BP Cuff Size: Adult)   Pulse 94   Temp 36.2 °C (97.2 °F) (Temporal)   Resp 14   Ht 1.803 m (5' 11\")   Wt 65.3 kg (144 lb)   SpO2 98%   BMI 20.08 kg/m²     EOM intact bilaterally. PERRLA. Eye was numbed with proparacaine and then stained with fluorescein.  It was then evaluated under Woods lamp which showed large corneal abrasion to the lateral aspect of his right eye.    Assessment/Plan:     1. Abrasion of right cornea, initial encounter  - moxifloxacin (VIGAMOX) 0.5 % Solution; Administer 1 Drop into the right eye 3 times a day.  Dispense: 3 mL; Refill: 0      -May continue full duty.  -Prescription for Vigamox has been sent.    "

## 2025-03-13 ENCOUNTER — TELEPHONE (OUTPATIENT)
Dept: URGENT CARE | Facility: CLINIC | Age: 25
End: 2025-03-13
Payer: COMMERCIAL

## 2025-03-19 ENCOUNTER — OCCUPATIONAL MEDICINE (OUTPATIENT)
Dept: URGENT CARE | Facility: CLINIC | Age: 25
End: 2025-03-19
Payer: COMMERCIAL

## 2025-03-19 VITALS
DIASTOLIC BLOOD PRESSURE: 84 MMHG | HEART RATE: 83 BPM | OXYGEN SATURATION: 96 % | TEMPERATURE: 98.4 F | HEIGHT: 71 IN | BODY MASS INDEX: 20.02 KG/M2 | SYSTOLIC BLOOD PRESSURE: 122 MMHG | RESPIRATION RATE: 13 BRPM | WEIGHT: 143 LBS

## 2025-03-19 DIAGNOSIS — Z02.6 ENCOUNTER RELATED TO WORKER'S COMPENSATION CLAIM: ICD-10-CM

## 2025-03-19 DIAGNOSIS — S05.01XD ABRASION OF RIGHT CORNEA, SUBSEQUENT ENCOUNTER: ICD-10-CM

## 2025-03-19 PROCEDURE — 3074F SYST BP LT 130 MM HG: CPT | Performed by: PHYSICIAN ASSISTANT

## 2025-03-19 PROCEDURE — 3079F DIAST BP 80-89 MM HG: CPT | Performed by: PHYSICIAN ASSISTANT

## 2025-03-19 PROCEDURE — 99213 OFFICE O/P EST LOW 20 MIN: CPT | Performed by: PHYSICIAN ASSISTANT

## 2025-03-19 NOTE — LETTER
"PHYSICIAN’S AND CHIROPRACTIC PHYSICIAN'S   PROGRESS REPORT   CERTIFICATION OF DISABILITY Claim Number:     Social Security Number:    Patient’s Name: Renan Stevens Date of Injury: 3/8/2025   Employer: [employer] Name of MCO (if applicable):      Patient’s Job Description/Occupation: [employer]       Previous Injuries/Diseases/Surgeries Contributing to the Condition:         Diagnosis: (S05.01XD) Abrasion of right cornea, subsequent encounter  (Z02.6) Encounter related to worker's compensation claim      Related to the Industrial Injury? Yes     Explain: Copied from last visit:  \"Renan Stevens is a 25 y.o. male who presents for Other (Pt states he was spotting a kid and she flailed her legs hitting him in his eye, red and swollen (R) side )     DOI: 3/8/2025  TENZIN: He was spotting a child at work when she accidentally his right eye with her toe.  He subsequently developed irritation, discharge, and pinkish discoloration to his eye. He does wear both corrective eye glasses and contact lenses. Denies prior eye injury. He also teachers dance as secondary employment.  \"  I was shown to have corneal abrasion after fluorescein stain on date of initial exam.  Treated with moxifloxacin.    Update 3/19/2025: Patient reports no residual pain.  No discharge.  He does endorse significant wearing his contacts as he does not have any prescription glasses.  Completely asymptomatic at this time.  No foreign body sensation.        Objective Medical Findings: Mild conjunctival injection.  Pupils equal round and reactive to light.  EOM intact.  No discharge noted to the lash line.  Visual acuity 20/20 OU, 20/20 OD, 20/15 OS.           None - Discharged                         Stable  No                 Ratable  No     X   Generally Improved                         Condition Worsened                  Condition Same  May Have Suffered a Permanent Disability No     Treatment Plan:    MMI  No residual pain, mild " conjunctival injection, no evidence of bacterial conjunctivitis  Visual acuity within normal limits  Patient reports asymptomatic, interested in case closure  Patient advised not to wear contact lenses and obtain prescription glasses with risk for corneal ulceration.  Patient demonstrates understanding.  Complete antibiotic drops         No Change in Therapy                  PT/OT Prescribed                      Medication May be Used While Working        Case Management                          PT/OT Discontinued    Consultation    Further Diagnostic Studies:    Prescription(s)               X  Released to FULL DUTY /No Restrictions on (Date):       Certified TOTALLY TEMPORARILY DISABLED (Indicate Dates) From:   To:      Released to RESTRICTED/Modified Duty on (Date): From:   To:    Restrictions Are:         No Sitting    No Standing    No Pulling Other: MMI       No Bending at Waist     No Stooping     No Lifting        No Carrying     No Walking Lifting Restricted to (lbs.):          No Pushing        No Climbing     No Reaching Above Shoulders       Date of Next Visit:   N/A Date of this Exam: 3/19/2025 Physician/Chiropractic Physician Name: Mel Garza P.A.-C. Physician/Chiropractic Physician Signature:  Jan Mccallum DO MPH     [place]:  [address] [place] [address], [address] [place], [place] [address] - Telephone [phone] [place]:  [address] [place] [address], [address] [place], [place] [address] - Telephone [phone]    [email or web address]  D-39 (Rev. 10/24)

## 2025-03-19 NOTE — PROGRESS NOTES
"Subjective     Renan Stevens is a 25 y.o. male who presents with Other (Wc fv states eye is doing much better )          Copied from last visit:  \"Renan Stevens is a 25 y.o. male who presents for Other (Pt states he was spotting a kid and she flailed her legs hitting him in his eye, red and swollen (R) side )     DOI: 3/8/2025  TENZIN: He was spotting a child at work when she accidentally his right eye with her toe.  He subsequently developed irritation, discharge, and pinkish discoloration to his eye. He does wear both corrective eye glasses and contact lenses. Denies prior eye injury. He also teachers dance as secondary employment.  \"  I was shown to have corneal abrasion after fluorescein stain on date of initial exam.  Treated with moxifloxacin.    Update 3/19/2025: Patient reports no residual pain.  No discharge.  He does endorse significant wearing his contacts as he does not have any prescription glasses.  Completely asymptomatic at this time.  No foreign body sensation.             Objective     /84 (BP Location: Left arm, Patient Position: Sitting, BP Cuff Size: Adult)   Pulse 83   Temp 36.9 °C (98.4 °F) (Temporal)   Resp 13   Ht 1.803 m (5' 11\")   Wt 64.9 kg (143 lb)   SpO2 96%   BMI 19.94 kg/m²      Physical Exam    Mild conjunctival injection.  Pupils [place] to light.  EOM intact.  No discharge noted to the lash line.  Visual acuity 20/20 OU, 20/20 OD, 20/15 OS.                          Assessment & Plan  Abrasion of right cornea, subsequent encounter         Encounter related to worker's compensation claim            MMI  No residual pain, mild conjunctival injection, no evidence of bacterial conjunctivitis  Visual acuity within normal limits  Patient reports asymptomatic, interested in case closure  Patient advised not to wear contact lenses and obtain prescription glasses with risk for corneal ulceration.  Patient demonstrates understanding.  Complete antibiotic drops          "

## 2025-03-27 ENCOUNTER — APPOINTMENT (OUTPATIENT)
Dept: NEUROLOGY | Facility: MEDICAL CENTER | Age: 25
End: 2025-03-27
Attending: STUDENT IN AN ORGANIZED HEALTH CARE EDUCATION/TRAINING PROGRAM
Payer: COMMERCIAL

## 2025-04-18 ENCOUNTER — OFFICE VISIT (OUTPATIENT)
Dept: NEUROLOGY | Facility: MEDICAL CENTER | Age: 25
End: 2025-04-18
Attending: STUDENT IN AN ORGANIZED HEALTH CARE EDUCATION/TRAINING PROGRAM
Payer: COMMERCIAL

## 2025-04-18 VITALS
BODY MASS INDEX: 19.72 KG/M2 | HEIGHT: 71 IN | WEIGHT: 140.87 LBS | HEART RATE: 91 BPM | TEMPERATURE: 97.7 F | DIASTOLIC BLOOD PRESSURE: 64 MMHG | OXYGEN SATURATION: 99 % | SYSTOLIC BLOOD PRESSURE: 110 MMHG

## 2025-04-18 DIAGNOSIS — Z72.820 POOR SLEEP: ICD-10-CM

## 2025-04-18 DIAGNOSIS — R53.83 OTHER FATIGUE: ICD-10-CM

## 2025-04-18 DIAGNOSIS — G40.309 GENERALIZED EPILEPSY (HCC): ICD-10-CM

## 2025-04-18 DIAGNOSIS — Z91.89 AT RISK FOR SIDE EFFECT OF MEDICATION: ICD-10-CM

## 2025-04-18 DIAGNOSIS — G47.19 EXCESSIVE DAYTIME SLEEPINESS: ICD-10-CM

## 2025-04-18 DIAGNOSIS — R40.4 TRANSIENT ALTERATION OF AWARENESS: ICD-10-CM

## 2025-04-18 DIAGNOSIS — G40.B09 NONINTRACTABLE JUVENILE MYOCLONIC EPILEPSY WITHOUT STATUS EPILEPTICUS (HCC): ICD-10-CM

## 2025-04-18 PROCEDURE — 99214 OFFICE O/P EST MOD 30 MIN: CPT | Performed by: STUDENT IN AN ORGANIZED HEALTH CARE EDUCATION/TRAINING PROGRAM

## 2025-04-18 PROCEDURE — 3078F DIAST BP <80 MM HG: CPT | Performed by: STUDENT IN AN ORGANIZED HEALTH CARE EDUCATION/TRAINING PROGRAM

## 2025-04-18 PROCEDURE — 99211 OFF/OP EST MAY X REQ PHY/QHP: CPT | Performed by: STUDENT IN AN ORGANIZED HEALTH CARE EDUCATION/TRAINING PROGRAM

## 2025-04-18 PROCEDURE — 3074F SYST BP LT 130 MM HG: CPT | Performed by: STUDENT IN AN ORGANIZED HEALTH CARE EDUCATION/TRAINING PROGRAM

## 2025-04-18 RX ORDER — LEVETIRACETAM 500 MG/1
500 TABLET ORAL 2 TIMES DAILY
Qty: 180 TABLET | Refills: 3 | Status: SHIPPED | OUTPATIENT
Start: 2025-04-18 | End: 2026-04-13

## 2025-04-18 ASSESSMENT — PATIENT HEALTH QUESTIONNAIRE - PHQ9: CLINICAL INTERPRETATION OF PHQ2 SCORE: 0

## 2025-04-18 NOTE — PATIENT INSTRUCTIONS
NEUROLOGY CLINIC VISIT WITH DR. FIGUEROA     PLEASE READ THIS ENTIRE DOCUMENT CAREFULLY AND COMPLETELY:    First and foremost, you matter to Dr. Figueroa and you deserve the best care.   Dr. Figueroa prides himself on providing the best possible care to all his patients. He strives to make each appointment meaningful, so that all your concerns are being addressed and all your neurological problems are being optimally treated. In order to achieve these goals for everyone, Dr. Figueroa has listed important reminders and the best ways to prepare for each appointment. Please read each item carefully. Thank you!    Due to the high volume of patients we are trying to help, your physician will not be able to respond by phone or in [employer] to your routine concerns between appointments.  This does not reflect a lack of interest or concern for you or your diagnosis.  Please bring these questions and concerns to your appointment where your physician can answer.  Please relay more pressing concerns to our office, either via [employer], or by phone; if not able to reach us please visit nearby Urgent Care Center or Emergency Department.  If any emergent medical needs, please seek emergent medical help and/or call 911.    Also, please note that we are not able to fill out paperwork that might be related to your work, utility company, disability, and/or driving, among others, in between the visits.  Please schedule a dedicated appointment to address any and all paperwork.  This is not due to lack of concern or interest for your disease-related work/administrative problems, but to make sure that we provide the best possible care and to fill out your paperwork in a correct, complete, and timely manner.  ------------------------------------------------------------------------------------------  Please let our office know if you have any changes in your seizure frequency and/characteristics.     Please keep a diary of your seizures and bring it  with you to each appointment.    Please take vitamin D3 [id number] internation units daily.     Please abstain from driving until further notice    If you are a biological female with epilepsy who is of reproductive age, who is actively breastfeeding, and/or who infants/young children:  Please take folic acid 1 mg daily. This is an over-the-counter supplement that is recommended to prevent certain developmental problems in your baby, in case you become pregnant in the future.  It is critical that you let our office know as soon as you become pregnant or plan to become pregnant.  If you are caring for a baby/young child, please make sure to be sitting on a soft surface while holding your baby/young child, so in case you have a seizure, your baby/young child is not injured due to fall.   Please let us know if, while breastfeeding, you observe that your baby is excessively sleepy and/or has other behavioral changes. Because many antiseizure medications are collected in breast milk, some nursing babies can suffer adverse medication effects.    Please note that the following might precipitate seizures:   missed doses of antiseizure medications  being sick with a fever, stress  Fatigue  sleep deprivation or abnormal sleeping patterns  not eating regularly  not drinking enough water  drinking too much alcohol  stopping alcohol suddenly if you are currently using it on a regular/daily basis,   using recreational drugs, among others.    Please note that the following might lead to an injury or even be life-threatening in the event you have a seizure and/or lose awareness while:  being in a large body of water by yourself, such as bath, pool, lake, ocean, among others (risk of drowning)  being on unprotected heights (risk of fall)  being around and/or operating heavy machinery (risk of injury)  being around open fire/hot surfaces (risk of burns)  any other activities/circumstances, in which if you lose awareness, you might  injure yourself and/or others.  -------------------------------------------------------------------------------------------  SUDEP (SUDDEN UNEXPECTED DEATH IN EPILEPSY)  It is important that your seizures are well controlled and you have none or have them rarely. In addition to avoiding injury related to breakthrough seizures, frequent seizures increase risk of SUDEP (sudden unexpected death in epilepsy), where a person goes into a seizure and then never wakes up. The best way to prevent SUDEP is to control your seizures well.   ------------------------------------------------------------------------------------------  Please call for help (crisis line and/or 911) in case you have thoughts of harming yourself and/or others.  ------------------------------------------------------------------------------------------  INSTRUCTIONS FOR YOUR FAMILY/CAREGIVERS:  Please call 911 if the patient has a seizure longer than 2-3 minutes, if seizures are back to back without her recovering to her baseline, or she does not start recovering within 5-10 minutes after the seizure stops. During the seizure - please turn her on her side, please make sure her head is protected (for example, you should put a pillow under her head, if one is available), and please do not put anything in her mouth.   ------------------------------------------------------------------------------------------  PATIENT EXPECTATIONS,  IMPORTANT APPOINTMENT REMINDERS, AND ADDITIONAL HELPFUL TIPS:   REFILLS:   Request refills AT LEAST 1 week in advance to ensure you do not run out of medications    MyChart  It is STRONGLY encouraged that ALL patients sign up for MyChart. It is BY FAR the fastest and most convenient way for both Dr. Figueroa and patients to obtain timely refills.  If you are having trouble signing up or logging into your account, staff are available to help you. Please ask a medical assistant or staff at the  to assist you.    TEST RESULS:    All labs and diagnostic test results will be reviewed at your next visit, UNLESS  Dr. Figueroa determines that there are important findings on the tests need to be acted on sooner. Dr. Figueroa will either call or send a message through [employer] if this is the case.    BE PREPARED PRIOR TO EVERY APPOINTMENT:  All patient are responsible for ensuring that ALL test results that were completed outside of the [employer] system have been received by our Neurology Department PRIOR to your appointment with Dr. Figueroa.    IMPORTANT:  ALL images (not just the reports) must be sent and uploaded to the [employer] system. Dr. Figueroa reviews all images personally prior to each visit. Ensuring that ALL the test results and test images are accessible to Dr. Figueroa prior to your appointment is YOUR responsibility and an important part of making the most out of each appointment.   Bring a government-issues picture ID and an updated insurance card EVERY visit.  It is highly recommended that you bring at every visit a list of the most important topics that you want address. While it may not be possible to address all items on the list in a single visit, preparing a list will ensure that Dr. Figueroa addresses the items that are most important to you and your health    PAPERWORK, DOCUMENTATION, LETTER REQUESTS:  You must notify the office ahead of your appointment of all paperwork or letter requests.   Please DO NOT wait until the last minute to make these requests. Please give all paperwork to the medical assistant at the start of the appointment and check-in process. Please note that Dr. Figueroa may not be able complete some types of documentation in a single appointment or even within a single day or week. This is why it is important to communicate paperwork requests prior to your appointment and at least 2 weeks prior to any deadlines.    KNOW ALL YOU MEDICATIONS:   AT EVERY SINGLE APPOINTMENT, please bring a list of every single prescribed,  non-prescribed, and over the counter medication or supplements you are taking, including ones taken on a rare or intermittent basis.  Include the following information for each prescribed or non-prescribed medications:  Name of medication   The strength of EACH pill/capsule/tablet, etc.   The number of pills/capsules/tablets, etc taken per dose  The number and time of day that doses are taken  For every single Supplement that you take on a routine or intermittent basis, you must include:  The Brand Name   A complete list of every single ingredient, compound, vitamin, and/or mineral in each dose, along with the corresponding amounts/strengths of all ingredients, vitamins, minerals, etc., if such information is provided or known  The number of doses taken per day and time of day doses are taken  If medications are taken on an intermittent or as needed basis, please estimate how many days per week or days per month the medications are used  DO NOT just print out your medication list from [employer] or bring a list from a prior appointment or hospitalizations because the information is often often unreliable, inaccurate, outdated, and/or incomplete   The list should be printed or written  If you forget or do not have a list of all the medication, then it is acceptable, although less preferred, to bring all the bottles to the appointment     ARRIVE EARLY FOR ALL VISITS:  Please note that we are unable to accommodate late arrivals as per office policy.  YOU-the patient - (NOT a parent, spouse, or friend) must be physically present at check-in no later than 12 minutes after the scheduled appointment time, or you will be asked to reschedule   Consider scheduling a virtual appointment with Dr. Figueroa through [employer] as an alternative if transportation to the clinic is difficult or unavailable   Please note, however, that virtual visits can only be scheduled after being an established patient of Dr. Figueroa. All new appointments  "must be done in-person in clinic  Some insurances will not cover the cost of virtual appointments. Please check with your insurance to find out if these visits are covered    COMMUNICATING URGENT AND NON-URGENT MATTERS:  Your concerns are important and deserve to be heard and addressed. If you have an urgent matter, there are two methods that will ensure your concerns are prioritized appropriately:   Preferred method: Sign-up/Login to your [employer] account and send a message addressed to Dr. Figueroa or Raya Flores (Dr. Figueroa's assistant). In the subject line, type \"urgent\" followed by a word or phrase describing the situation (For example, write \"Urgent: Out of antiseuzre med and need refill\" or \"Urgent: Severe side effects to new meds\". In doing this, our staff can ensure urgent messages are triaged appropriately and communicated to Dr. Figueroa that day.  Call [facility] main line at [phone]. Dr. Figueroa's voicemail extension is [phone]. When leaving a voice message, specifically indicate if it is urgent (or non-urgent) so that the matter can be triaged appropriately and addressed in a timely manner    Thank you for entrusting your neurological care to [facility] and we look forward to continuing to serve you.   "

## 2025-04-18 NOTE — PROGRESS NOTES
"NEUROLOGY NEW PATIENT ENCOUNTER - 04/18/2025   REFERRING PROVIDER:  Chang Gomez D.O.  [address]  [address]  [place] [address]  Chang Gomez   REASON FOR VISIT: Renan Stevens 25 y.o. male presents today to establish care with me.    SUMMARY OF PROBLEMS AND/OR DIAGNOSES:  Patient with MALA and who has been following with Dr. Gomez who has since left [facility]. He is here to establish care with me.    Since started keppra he no longer has myoclonic jerks. He denies ever having a convulsion or episodes concerning for absence seizure. Since starting keppra though he has felt very tired during the day. When he wake up in the morning he never feels rested.    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 No other health concerns    No family hx of epilepsy. No other risk factors for seizure disorder    Depression Screening    Little interest or pleasure in doing things?  0 - not at all  Feeling down, depressed , or hopeless? 0 - not at all  Patient Health Questionnaire Score: 0    If depressive symptoms identified deferred to follow up visit unless specifically addressed in assesment and plan.      Interpretation of PHQ-9 Total Score   Score Severity   1-4 Minimal Depression   5-9 Mild Depression   10-14 Moderate Depression   15-19 Moderately Severe Depression   20-27 Severe Depression       Epilepsy Risk Factors (CHECK MARKS INDICATE \"YES\"):   [] Head trauma with LOC   [] History of meningitis/encephalitis/abscess in or around the brain  [] Febrile Seizures  [] Family History of seizures and/or Epilepsy  [] History of developmental delay, grade school IEPs, or significant struggles in one or more areas of academics or learning  [] Birth complications   [] History of brain lesion/scarring from any cause (ie brain tumour, brain surgery, brain bleed, cortical stroke, neurodegenerative disease, gliosis from head trauma, neurocystocicercosis  etc)    RELEVANT DATA PERSONALLY " "REVIEWED:     Patient's PMH, PSH, FH, SH, allergies, and medications were reviewed:   has a past medical history of No known health problems.    has no past surgical history on file.   family history includes Asthma in his sister.    reports that he has never smoked. He has never used smokeless tobacco. He reports current alcohol use. He reports that he does not use drugs.     ROS negative except that which was mentioned above.    CURRENT MEDICATIONS AT THE TIME OF THIS ENCOUNTER:  Current Outpatient Medications on File Prior to Visit   Medication Sig Dispense Refill    moxifloxacin (VIGAMOX) 0.5 % Solution Administer 1 Drop into the right eye 3 times a day. 3 mL 0    vitamin D2, Ergocalciferol, (DRISDOL) 1.25 MG (93331 UT) Cap capsule Take 1.25 Units by mouth one time as needed. Weekly       No current facility-administered medications on file prior to visit.        EXAM:   Ambulatory Vitals:  /64 (BP Location: Left arm, Patient Position: Sitting, BP Cuff Size: Adult)   Pulse 91   Temp 36.5 °C (97.7 °F) (Temporal)   Ht 1.803 m (5' 11\")   Wt 63.9 kg (140 lb 14 oz)   SpO2 99%    Physical Exam:  Physical Exam  Eyes:      Extraocular Movements: EOM normal. No nystagmus.   Psychiatric:         Speech: Speech normal.        Neurological Exam   Neurological Exam  Mental Status  Awake, alert and oriented to person, place and time. Recent and remote memory are intact. Speech is normal. Language is fluent with no aphasia. Attention and concentration are normal. Fund of knowledge is appropriate for level of education.    Cranial Nerves  CN II: Right normal visual field. Left normal visual field. Right funduscopic exam: not visualized. Left funduscopic exam: not visualized.  CN III, IV, VI: Extraocular movements intact bilaterally. No nystagmus. Normal saccades. Normal smooth pursuit.   Right pupil: 3 mm. Round. Reactive to light. Reactive to accommodation.   Left pupil: 3 mm. Round. Reactive to light. Reactive to " accommodation.  Relative afferent pupillary defect absent.  CN V: Facial sensation is normal.  CN VII: Full and symmetric facial movement.  CN IX, X: Palate elevates symmetrically  CN XI: Shoulder shrug strength is normal.  CN XII: Tongue midline without atrophy or fasciculations.    Motor  Normal muscle bulk throughout. Normal muscle tone. No abnormal involuntary movements. Strength is 5/5 in all four extremities except as noted. No pronator drift.    Sensory  Light touch is normal in upper and lower extremities. Vibration is normal in upper and lower extremities.  No right-sided hemispatial neglect. No left-sided hemispatial neglect. Right agraphesthesia absent. Left agraphesthesia absent. Right extinction absent: Left extinction absent:    Reflexes  Deep tendon reflexes are 2+ and symmetric except as noted.    Coordination  Right: Finger-to-nose normal.Left: Finger-to-nose normal.    Gait  Casual gait is normal including stance, stride, and arm swing.       ASSESSMENT, EDUCATION, COUNSELING:  This is a 25 y.o. male patient who presents to the neurology clinic. We had an extensive discussion about the patient's symptoms, signs, and work-up to date, if any. We discussed potential and/or definitive diagnoses, work-up, and potential treatments. \    Visit Diagnoses     ICD-10-CM   1. Generalized epilepsy (HCC)  G40.309   2. 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 At risk for side effect of medication  Z91.89   4. Poor sleep  Z72.820   5. Other fatigue  R53.83   6. Excessive daytime sleepiness  G47.19   7. 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)    Relevant Medications    levETIRAcetam (KEPPRA) 500 MG Tab    Other Relevant Orders    EEG     Other Visit Diagnoses         Generalized epilepsy (HCC)         Relevant Medications    levETIRAcetam (KEPPRA) 500 MG Tab    Other Relevant Orders    EEG      At risk for side effect of medication          Poor sleep          Other fatigue        Relevant Orders    TSH+T4F+T3FREE      Excessive daytime sleepiness        Relevant Orders    TSH+T4F+T3FREE      Transient alteration of awareness        Relevant Orders    EEG             PLAN:  Medications administered in today's encounter if applicable:       If applicable, the work-up such as labs, imaging, procedures, and/or other testing, referrals, and/or recommended treatment strategies are listed below.  Orders Placed This Encounter    TSH+T4F+T3FREE    EEG    levETIRAcetam (KEPPRA) 500 MG Tab           Medication List            Accurate as of April 18, 2025  9:03 AM. If you have any questions, ask your nurse or doctor.                CHANGE how you take these medications        Instructions   levETIRAcetam 500 MG Tabs  What changed:   medication strength  how much to take  when to take this  Commonly known as: Keppra  Changed by: Dr. Paul Figueroa   Doctor's comments: Dose change. Dose decrease  Take 1 Tablet by mouth 2 times a day for 360 days.  Dose: 500 mg            CONTINUE taking these medications        Instructions   moxifloxacin 0.5 % Soln  Commonly known as: Vigamox   Administer 1 Drop into the right eye 3 times a day.  Dose: 1 Drop     vitamin D2 (Ergocalciferol) 1.25 MG (46299 UT) Caps capsule  Commonly known as: Drisdol   Take 1.25 Units by mouth one time as needed. Weekly  Dose: 1.25 Units                   Patient with MALA who as not had any myoclonic jerks since starting keppra.However, he is having side effects of feeling excessive tired all the time. We will lower keppra dose to 500 mg twice per day. New Script sent to pharmacy. Will then obtain updated blood work and obtain a keppra level. Will also order a 24 hour EEG to ensure he is not having smaller seizures in his sleep, throughout the day which can be  a cause for poor sleep , chronic tired ness.     Follow-up in 4 months        BILLING DOCUMENTATION:     I spent a total of  I spent a total of 30 minutes on the day of the visit.   minutes of face-to-face time in this visit. Over 50% of the time of the visit today was spent on counseling and/or coordination of care wtih the patient and/or family, as above in assessment in plan.    Paul Figueroa MD  Epilepsy and General Neurology  Department of Neurology  Clinical  of Neurology [facility].

## 2025-07-24 ENCOUNTER — TELEPHONE (OUTPATIENT)
Dept: NEUROLOGY | Facility: MEDICAL CENTER | Age: 25
End: 2025-07-24
Payer: COMMERCIAL

## 2025-07-24 DIAGNOSIS — G40.309 GENERALIZED EPILEPSY (HCC): ICD-10-CM

## 2025-07-24 DIAGNOSIS — G40.B09 NONINTRACTABLE JUVENILE MYOCLONIC EPILEPSY WITHOUT STATUS EPILEPTICUS (HCC): ICD-10-CM

## 2025-07-24 NOTE — TELEPHONE ENCOUNTER
Pt has had trouble getting his Keppra medication from his pharmacy stating that his [facility] never received the order and cancelled it completely. He would like some help from the MA to get it sorted out

## 2025-07-25 RX ORDER — LEVETIRACETAM 500 MG/1
500 TABLET ORAL 2 TIMES DAILY
Qty: 180 TABLET | Refills: 0 | Status: SHIPPED | OUTPATIENT
Start: 2025-07-25

## 2025-08-04 ENCOUNTER — TELEPHONE (OUTPATIENT)
Dept: NEUROLOGY | Facility: MEDICAL CENTER | Age: 25
End: 2025-08-04
Payer: COMMERCIAL

## 2025-08-07 ENCOUNTER — TELEPHONE (OUTPATIENT)
Dept: NEUROLOGY | Facility: MEDICAL CENTER | Age: 25
End: 2025-08-07
Payer: COMMERCIAL

## 2025-08-11 ENCOUNTER — TELEPHONE (OUTPATIENT)
Dept: NEUROLOGY | Facility: MEDICAL CENTER | Age: 25
End: 2025-08-11
Payer: COMMERCIAL